# Patient Record
Sex: FEMALE | Race: WHITE | NOT HISPANIC OR LATINO | URBAN - METROPOLITAN AREA
[De-identification: names, ages, dates, MRNs, and addresses within clinical notes are randomized per-mention and may not be internally consistent; named-entity substitution may affect disease eponyms.]

---

## 2023-01-30 ENCOUNTER — APPOINTMENT (RX ONLY)
Dept: URBAN - METROPOLITAN AREA CLINIC 76 | Facility: CLINIC | Age: 58
Setting detail: DERMATOLOGY
End: 2023-01-30

## 2023-01-30 DIAGNOSIS — H01.13 ECZEMATOUS DERMATITIS OF EYELID: ICD-10-CM

## 2023-01-30 DIAGNOSIS — I78.8 OTHER DISEASES OF CAPILLARIES: ICD-10-CM

## 2023-01-30 PROBLEM — L30.9 DERMATITIS, UNSPECIFIED: Status: ACTIVE | Noted: 2023-01-30

## 2023-01-30 PROCEDURE — ? PHOTO-DOCUMENTATION

## 2023-01-30 PROCEDURE — ? PRESCRIPTION

## 2023-01-30 PROCEDURE — 99203 OFFICE O/P NEW LOW 30 MIN: CPT

## 2023-01-30 PROCEDURE — ? TREATMENT REGIMEN

## 2023-01-30 PROCEDURE — ? COUNSELING

## 2023-01-30 RX ORDER — FLUTICASONE PROPIONATE 0.5 MG/G
CREAM TOPICAL
Qty: 15 | Refills: 0 | Status: CANCELLED | COMMUNITY
Start: 2023-01-30

## 2023-01-30 RX ADMIN — FLUTICASONE PROPIONATE: 0.5 CREAM TOPICAL at 00:00

## 2023-01-30 ASSESSMENT — LOCATION ZONE DERM: LOCATION ZONE: EYELID

## 2023-01-30 ASSESSMENT — LOCATION SIMPLE DESCRIPTION DERM: LOCATION SIMPLE: LEFT SUPERIOR EYELID

## 2023-01-30 ASSESSMENT — LOCATION DETAILED DESCRIPTION DERM: LOCATION DETAILED: LEFT MEDIAL SUPERIOR EYELID

## 2023-01-30 NOTE — HPI: RASH
What Type Of Note Output Would You Prefer (Optional)?: Standard Output
Is The Patient Presenting As Previously Scheduled?: Yes
How Severe Is Your Rash?: mild
Is This A New Presentation, Or A Follow-Up?: Rash
Additional History: Patient had eyelash extensions put on in June.

## 2023-01-30 NOTE — PROCEDURE: TREATMENT REGIMEN
Detail Level: Zone
Otc Regimen: Aquaphor
Initiate Treatment: Fluticasone to affected areas BID until clear and taper off use
Plan: Discussed stopping eye creams until clear and then introduce one medication at a time. \\n\\nDiscussed to avoid Neosporin

## 2023-01-31 RX ORDER — FLUTICASONE PROPIONATE 0.5 MG/G
CREAM TOPICAL
Qty: 15 | Refills: 0 | Status: ERX

## 2023-02-06 ENCOUNTER — APPOINTMENT (RX ONLY)
Dept: URBAN - METROPOLITAN AREA CLINIC 76 | Facility: CLINIC | Age: 58
Setting detail: DERMATOLOGY
End: 2023-02-06

## 2023-02-06 DIAGNOSIS — L81.4 OTHER MELANIN HYPERPIGMENTATION: ICD-10-CM

## 2023-02-06 DIAGNOSIS — I78.8 OTHER DISEASES OF CAPILLARIES: ICD-10-CM

## 2023-02-06 PROCEDURE — ? EXCEL V LASER

## 2023-02-06 PROCEDURE — ? Q-SWITCHED LASER

## 2023-02-06 PROCEDURE — ? PRESCRIPTION

## 2023-02-06 PROCEDURE — ? INVENTORY

## 2023-02-06 RX ORDER — PREDNISONE 10 MG/1
TABLET ORAL
Qty: 9 | Refills: 0 | Status: ERX | COMMUNITY
Start: 2023-02-06

## 2023-02-06 RX ADMIN — PREDNISONE: 10 TABLET ORAL at 00:00

## 2023-02-06 ASSESSMENT — LOCATION ZONE DERM
LOCATION ZONE: FACE
LOCATION ZONE: HAND
LOCATION ZONE: NOSE
LOCATION ZONE: TRUNK

## 2023-02-06 ASSESSMENT — LOCATION DETAILED DESCRIPTION DERM
LOCATION DETAILED: RIGHT MEDIAL SUPERIOR CHEST
LOCATION DETAILED: UPPER STERNUM
LOCATION DETAILED: RIGHT CENTRAL MALAR CHEEK
LOCATION DETAILED: RIGHT RADIAL DORSAL HAND
LOCATION DETAILED: SUPERIOR MID FOREHEAD
LOCATION DETAILED: LEFT ULNAR DORSAL HAND
LOCATION DETAILED: LEFT MEDIAL SUPERIOR CHEST
LOCATION DETAILED: LEFT MID PREAURICULAR CHEEK
LOCATION DETAILED: LEFT CENTRAL MALAR CHEEK
LOCATION DETAILED: RIGHT SUPERIOR FOREHEAD
LOCATION DETAILED: RIGHT NASAL ALA
LOCATION DETAILED: LEFT FOREHEAD
LOCATION DETAILED: RIGHT INFERIOR CENTRAL MALAR CHEEK

## 2023-02-06 ASSESSMENT — LOCATION SIMPLE DESCRIPTION DERM
LOCATION SIMPLE: RIGHT CHEEK
LOCATION SIMPLE: RIGHT NOSE
LOCATION SIMPLE: LEFT FOREHEAD
LOCATION SIMPLE: SUPERIOR FOREHEAD
LOCATION SIMPLE: CHEST
LOCATION SIMPLE: RIGHT HAND
LOCATION SIMPLE: LEFT CHEEK
LOCATION SIMPLE: LEFT HAND
LOCATION SIMPLE: RIGHT FOREHEAD

## 2023-02-06 NOTE — PROCEDURE: Q-SWITCHED LASER
Frequency In Hz: 1
Detail Level: Detailed
Frequency In Hz: 5
External Cooling Fan Speed: 3
Spot Size In Mm: 2
Post-Care Instructions: I reviewed with the patient in detail post-care instructions. Patient should avoid sunlight and wear sun protection.
Endpoint: Immediate endpoint whitening and pinpoint bleeding. Vaseline and ice applied. Post care reviewed with patient.
External Cooling: Virginie Cryo 5
Anesthesia Type: 1% lidocaine with epinephrine
Number Of Pulses: 985
Eye Protection: opaque eyeshield
Consent: Written consent obtained, risks reviewed including but not limited to crusting, scabbing, blistering, scarring, darker or lighter pigmentary change, systemic reactions, ulceration, incidental hair removal, bruising, and/or incomplete removal.
Area In Cm^2: 0
Fluence: 11

## 2023-02-06 NOTE — PROCEDURE: EXCEL V LASER
Laser Type: KTP 532nm
External Cooling Fan Speed: 3
Device Serial Number (Optional): Focal settin.8J, 5mm, 10ms DONE FIRST***
Treatment Number (Will Not Render If 0): 0
Consent: Written consent obtained, risks reviewed including but not limited to crusting, scabbing, blistering, scarring, darker or lighter pigmentary change, and/or incomplete removal. The treatment areas were anesthetized with 20% lidocaine prior to the procedure. 20% lidocaine in plasticized base applied 30 minutes prior to treatment without occlusion and without complication. All persons in the room were wearing goggles during the treatment. Cold gel applied to treatment areas. The treatment areas were treated as noted above. Post-op instructions discussed with patient.
Pre-Procedure Text: After consent was obtained and the procedure was explained, all persons present in the exam room put on their protective eyewear. Cold gel was applied to the treatment areas.
Topical Anesthesia Type: 20% lidocaine
Fluence In J: 5.8
Temperature: 5 C
Pulse Width In Msec: 12
External Cooling: Virginie Cryo 5
Detail Level: Detailed
Length Of Topical Anesthesia Application (Optional): 20 minutes
Spot Size: 5
Post-Care Instructions: I reviewed with the patient in detail post-care instructions. Patient is to apply vaseline with a q-tip to all crusted areas, and avoid picking at any scabs. Pt should stay away from the sun and wear sun protection until fully healed.
Procedural Text: The treatment areas where then treated as noted above.
Post-Procedure Text: Following the treatment, ice and broad spectrum sunscreen was applied to the treatment areas.  Post-care instructions were discussed.

## 2023-02-28 ENCOUNTER — APPOINTMENT (RX ONLY)
Dept: URBAN - METROPOLITAN AREA CLINIC 76 | Facility: CLINIC | Age: 58
Setting detail: DERMATOLOGY
End: 2023-02-28

## 2023-02-28 DIAGNOSIS — H01.13 ECZEMATOUS DERMATITIS OF EYELID: ICD-10-CM

## 2023-02-28 PROBLEM — L30.9 DERMATITIS, UNSPECIFIED: Status: ACTIVE | Noted: 2023-02-28

## 2023-02-28 PROCEDURE — 99213 OFFICE O/P EST LOW 20 MIN: CPT

## 2023-02-28 PROCEDURE — ? TREATMENT REGIMEN

## 2023-02-28 PROCEDURE — ? COUNSELING

## 2023-02-28 ASSESSMENT — LOCATION DETAILED DESCRIPTION DERM: LOCATION DETAILED: LEFT MEDIAL SUPERIOR EYELID

## 2023-02-28 ASSESSMENT — LOCATION SIMPLE DESCRIPTION DERM: LOCATION SIMPLE: LEFT SUPERIOR EYELID

## 2023-02-28 ASSESSMENT — LOCATION ZONE DERM: LOCATION ZONE: EYELID

## 2023-02-28 NOTE — PROCEDURE: TREATMENT REGIMEN
Detail Level: Zone
Otc Regimen: Aquaphor
Discontinue Regimen: - concealer
Continue Regimen: - Fluticasone on hand for flairs
Plan: - talked about a cosmetic series patch testing with [facility]IL[facility] (will send referral if Pt wants it)

## 2023-10-16 ENCOUNTER — APPOINTMENT (RX ONLY)
Dept: URBAN - METROPOLITAN AREA CLINIC 76 | Facility: CLINIC | Age: 58
Setting detail: DERMATOLOGY
End: 2023-10-16

## 2023-10-16 DIAGNOSIS — L81.4 OTHER MELANIN HYPERPIGMENTATION: ICD-10-CM

## 2023-10-16 DIAGNOSIS — I78.8 OTHER DISEASES OF CAPILLARIES: ICD-10-CM

## 2023-10-16 PROCEDURE — ? ADDITIONAL NOTES

## 2023-10-16 PROCEDURE — ? EXCEL V LASER

## 2023-10-16 PROCEDURE — ? INVENTORY

## 2023-10-16 PROCEDURE — ? Q-SWITCHED LASER

## 2023-10-16 ASSESSMENT — LOCATION SIMPLE DESCRIPTION DERM
LOCATION SIMPLE: LEFT FOREHEAD
LOCATION SIMPLE: RIGHT FOREHEAD
LOCATION SIMPLE: LEFT CHEEK
LOCATION SIMPLE: CHEST
LOCATION SIMPLE: LEFT HAND
LOCATION SIMPLE: SUPERIOR FOREHEAD
LOCATION SIMPLE: LEFT FOREARM
LOCATION SIMPLE: RIGHT HAND
LOCATION SIMPLE: RIGHT FOREARM
LOCATION SIMPLE: RIGHT NOSE
LOCATION SIMPLE: RIGHT CHEEK

## 2023-10-16 ASSESSMENT — LOCATION DETAILED DESCRIPTION DERM
LOCATION DETAILED: LEFT CENTRAL MALAR CHEEK
LOCATION DETAILED: RIGHT NASAL ALA
LOCATION DETAILED: RIGHT PROXIMAL DORSAL FOREARM
LOCATION DETAILED: SUPERIOR MID FOREHEAD
LOCATION DETAILED: LEFT MID PREAURICULAR CHEEK
LOCATION DETAILED: LEFT PROXIMAL DORSAL FOREARM
LOCATION DETAILED: RIGHT CENTRAL MALAR CHEEK
LOCATION DETAILED: LEFT MEDIAL SUPERIOR CHEST
LOCATION DETAILED: RIGHT MEDIAL SUPERIOR CHEST
LOCATION DETAILED: RIGHT SUPERIOR FOREHEAD
LOCATION DETAILED: LEFT FOREHEAD
LOCATION DETAILED: LEFT ULNAR DORSAL HAND
LOCATION DETAILED: RIGHT INFERIOR CENTRAL MALAR CHEEK
LOCATION DETAILED: RIGHT RADIAL DORSAL HAND

## 2023-10-16 ASSESSMENT — LOCATION ZONE DERM
LOCATION ZONE: HAND
LOCATION ZONE: NOSE
LOCATION ZONE: FACE
LOCATION ZONE: TRUNK
LOCATION ZONE: ARM

## 2023-10-16 NOTE — PROCEDURE: EXCEL V LASER
Laser Type: KTP 532nm
External Cooling Fan Speed: 3
Device Serial Number (Optional): Focal settin.8J, 5mm, 10ms **DONE FIRST**
Treatment Number (Will Not Render If 0): 2
Consent: Written consent obtained, risks reviewed including but not limited to crusting, scabbing, blistering, scarring, darker or lighter pigmentary change, and/or incomplete removal. The treatment areas were anesthetized with 20% lidocaine prior to the procedure. 20% lidocaine in plasticized base applied 30 minutes prior to treatment without occlusion and without complication. All persons in the room were wearing goggles during the treatment. Cold gel applied to treatment areas. The treatment areas were treated as noted above. Post-op instructions discussed with patient.
Pre-Procedure Text: After consent was obtained and the procedure was explained, all persons present in the exam room put on their protective eyewear. Cold gel was applied to the treatment areas.
Topical Anesthesia Type: 20% lidocaine
Fluence In J: 6
Temperature: 5 C
Pulse Width In Msec: 10
External Cooling: Virginie Cryo 5
Detail Level: Detailed
Length Of Topical Anesthesia Application (Optional): 30 minutes
Spot Size: 12
Post-Care Instructions: I reviewed with the patient in detail post-care instructions. Patient is to apply vaseline with a q-tip to all crusted areas, and avoid picking at any scabs. Pt should stay away from the sun and wear sun protection until fully healed.
Procedural Text: The treatment areas where then treated as noted above.
Post-Procedure Text: Following the treatment, ice and broad spectrum sunscreen was applied to the treatment areas.  Post-care instructions were discussed.

## 2023-10-16 NOTE — PROCEDURE: Q-SWITCHED LASER
Frequency In Hz: 1
Detail Level: Detailed
Frequency In Hz: 5
External Cooling Fan Speed: 3
Spot Size In Mm: 2
Post-Care Instructions: I reviewed with the patient in detail post-care instructions. Patient should avoid sunlight and wear sun protection.
Endpoint: Immediate endpoint whitening and pinpoint bleeding. Vaseline and ice applied. Post care reviewed with patient.
External Cooling: Virginie Cryo 5
Anesthesia Type: 1% lidocaine with epinephrine
Eye Protection: opaque eyeshield
Consent: Written consent obtained, risks reviewed including but not limited to crusting, scabbing, blistering, scarring, darker or lighter pigmentary change, systemic reactions, ulceration, incidental hair removal, bruising, and/or incomplete removal.
Area In Cm^2: 0
Fluence: 16

## 2023-10-16 NOTE — PROCEDURE: ADDITIONAL NOTES
Additional Notes: 16j on face\\n14 j on chest, arms, hands
Detail Level: Simple
Render Risk Assessment In Note?: no